# Patient Record
Sex: FEMALE | Race: ASIAN | NOT HISPANIC OR LATINO | URBAN - METROPOLITAN AREA
[De-identification: names, ages, dates, MRNs, and addresses within clinical notes are randomized per-mention and may not be internally consistent; named-entity substitution may affect disease eponyms.]

---

## 2020-07-16 ENCOUNTER — EMERGENCY (EMERGENCY)
Facility: HOSPITAL | Age: 40
LOS: 1 days | Discharge: HOME | End: 2020-07-16
Attending: EMERGENCY MEDICINE
Payer: MEDICAID

## 2020-07-16 VITALS
RESPIRATION RATE: 16 BRPM | WEIGHT: 179.9 LBS | HEIGHT: 59 IN | DIASTOLIC BLOOD PRESSURE: 101 MMHG | TEMPERATURE: 99 F | HEART RATE: 95 BPM | SYSTOLIC BLOOD PRESSURE: 151 MMHG | OXYGEN SATURATION: 100 %

## 2020-07-16 LAB
ALBUMIN SERPL ELPH-MCNC: 3.7 G/DL — SIGNIFICANT CHANGE UP (ref 3.5–5)
ALP SERPL-CCNC: 62 U/L — SIGNIFICANT CHANGE UP (ref 40–120)
ALT FLD-CCNC: 47 U/L DA — SIGNIFICANT CHANGE UP (ref 10–60)
ANION GAP SERPL CALC-SCNC: 5 MMOL/L — SIGNIFICANT CHANGE UP (ref 5–17)
AST SERPL-CCNC: 33 U/L — SIGNIFICANT CHANGE UP (ref 10–40)
BASOPHILS # BLD AUTO: 0.04 K/UL — SIGNIFICANT CHANGE UP (ref 0–0.2)
BASOPHILS NFR BLD AUTO: 0.4 % — SIGNIFICANT CHANGE UP (ref 0–2)
BILIRUB SERPL-MCNC: 0.4 MG/DL — SIGNIFICANT CHANGE UP (ref 0.2–1.2)
BUN SERPL-MCNC: 6 MG/DL — LOW (ref 7–18)
CALCIUM SERPL-MCNC: 8.6 MG/DL — SIGNIFICANT CHANGE UP (ref 8.4–10.5)
CHLORIDE SERPL-SCNC: 103 MMOL/L — SIGNIFICANT CHANGE UP (ref 96–108)
CO2 SERPL-SCNC: 29 MMOL/L — SIGNIFICANT CHANGE UP (ref 22–31)
CREAT SERPL-MCNC: 0.64 MG/DL — SIGNIFICANT CHANGE UP (ref 0.5–1.3)
EOSINOPHIL # BLD AUTO: 0.15 K/UL — SIGNIFICANT CHANGE UP (ref 0–0.5)
EOSINOPHIL NFR BLD AUTO: 1.6 % — SIGNIFICANT CHANGE UP (ref 0–6)
GLUCOSE SERPL-MCNC: 93 MG/DL — SIGNIFICANT CHANGE UP (ref 70–99)
HCT VFR BLD CALC: 37.7 % — SIGNIFICANT CHANGE UP (ref 34.5–45)
HGB BLD-MCNC: 12.1 G/DL — SIGNIFICANT CHANGE UP (ref 11.5–15.5)
IMM GRANULOCYTES NFR BLD AUTO: 0.5 % — SIGNIFICANT CHANGE UP (ref 0–1.5)
LIDOCAIN IGE QN: 229 U/L — SIGNIFICANT CHANGE UP (ref 73–393)
LYMPHOCYTES # BLD AUTO: 1.56 K/UL — SIGNIFICANT CHANGE UP (ref 1–3.3)
LYMPHOCYTES # BLD AUTO: 16.5 % — SIGNIFICANT CHANGE UP (ref 13–44)
MCHC RBC-ENTMCNC: 26.9 PG — LOW (ref 27–34)
MCHC RBC-ENTMCNC: 32.1 GM/DL — SIGNIFICANT CHANGE UP (ref 32–36)
MCV RBC AUTO: 83.8 FL — SIGNIFICANT CHANGE UP (ref 80–100)
MONOCYTES # BLD AUTO: 0.77 K/UL — SIGNIFICANT CHANGE UP (ref 0–0.9)
MONOCYTES NFR BLD AUTO: 8.2 % — SIGNIFICANT CHANGE UP (ref 2–14)
NEUTROPHILS # BLD AUTO: 6.86 K/UL — SIGNIFICANT CHANGE UP (ref 1.8–7.4)
NEUTROPHILS NFR BLD AUTO: 72.8 % — SIGNIFICANT CHANGE UP (ref 43–77)
NRBC # BLD: 0 /100 WBCS — SIGNIFICANT CHANGE UP (ref 0–0)
PLATELET # BLD AUTO: 235 K/UL — SIGNIFICANT CHANGE UP (ref 150–400)
POTASSIUM SERPL-MCNC: 4.3 MMOL/L — SIGNIFICANT CHANGE UP (ref 3.5–5.3)
POTASSIUM SERPL-SCNC: 4.3 MMOL/L — SIGNIFICANT CHANGE UP (ref 3.5–5.3)
PROT SERPL-MCNC: 7.8 G/DL — SIGNIFICANT CHANGE UP (ref 6–8.3)
RBC # BLD: 4.5 M/UL — SIGNIFICANT CHANGE UP (ref 3.8–5.2)
RBC # FLD: 13.2 % — SIGNIFICANT CHANGE UP (ref 10.3–14.5)
SODIUM SERPL-SCNC: 137 MMOL/L — SIGNIFICANT CHANGE UP (ref 135–145)
WBC # BLD: 9.43 K/UL — SIGNIFICANT CHANGE UP (ref 3.8–10.5)
WBC # FLD AUTO: 9.43 K/UL — SIGNIFICANT CHANGE UP (ref 3.8–10.5)

## 2020-07-16 PROCEDURE — 99284 EMERGENCY DEPT VISIT MOD MDM: CPT | Mod: 25

## 2020-07-16 RX ORDER — KETOROLAC TROMETHAMINE 30 MG/ML
30 SYRINGE (ML) INJECTION ONCE
Refills: 0 | Status: DISCONTINUED | OUTPATIENT
Start: 2020-07-16 | End: 2020-07-16

## 2020-07-16 RX ORDER — MORPHINE SULFATE 50 MG/1
4 CAPSULE, EXTENDED RELEASE ORAL ONCE
Refills: 0 | Status: DISCONTINUED | OUTPATIENT
Start: 2020-07-16 | End: 2020-07-16

## 2020-07-16 RX ORDER — ONDANSETRON 8 MG/1
4 TABLET, FILM COATED ORAL ONCE
Refills: 0 | Status: COMPLETED | OUTPATIENT
Start: 2020-07-16 | End: 2020-07-16

## 2020-07-16 RX ADMIN — MORPHINE SULFATE 4 MILLIGRAM(S): 50 CAPSULE, EXTENDED RELEASE ORAL at 22:37

## 2020-07-16 RX ADMIN — Medication 30 MILLIGRAM(S): at 22:37

## 2020-07-16 RX ADMIN — ONDANSETRON 4 MILLIGRAM(S): 8 TABLET, FILM COATED ORAL at 22:36

## 2020-07-16 NOTE — ED PROVIDER NOTE - NS ED ROS FT
Pt denies fevers, chills  nausea, vomiting,   chest pain, palpitations  shortness of breath, orthopnea  melena,   dysuria, hematuria   numbness, weakness, saddle anesthesia  rash  enlarged lymph nodes

## 2020-07-16 NOTE — ED PROVIDER NOTE - PHYSICAL EXAMINATION
Merlene:   Vitals normal   mod-severe distress, writhing around in bed  Awake Alert oriented to person, place, situation,   Normocephalic, atraumatic, neck supple   lungs clear bilaterally  heart s1s rrr,  Abdomen soft, tender RUQ, nondistended  No LE swelling    No rash  Neuro exam grossly intact, no weakness, numbness,

## 2020-07-16 NOTE — ED PROVIDER NOTE - OBJECTIVE STATEMENT
38 yo female with ruq pain.  Pt has history of gallstone 2 years ago and has not had pain since.  She states he has had severe pain x 4 days after eating today.  Pt without prior surgeries, no etoh or NSDAID overuse. Pt denies nausea, vomiting, diarrhea, dysuria, hematuria, change in urinary frequency/urgency, flank pain.

## 2020-07-16 NOTE — ED PROVIDER NOTE - PATIENT PORTAL LINK FT
You can access the FollowMyHealth Patient Portal offered by [facility] by registering at the following website: [email or web address]. By joining [employer]’s FollowMyHealth portal, you will also be able to view your health information using other applications (apps) compatible with our system.

## 2020-07-17 VITALS
SYSTOLIC BLOOD PRESSURE: 140 MMHG | TEMPERATURE: 99 F | DIASTOLIC BLOOD PRESSURE: 78 MMHG | OXYGEN SATURATION: 98 % | RESPIRATION RATE: 16 BRPM | HEART RATE: 82 BPM

## 2020-07-17 LAB
APPEARANCE UR: CLEAR — SIGNIFICANT CHANGE UP
BILIRUB UR-MCNC: NEGATIVE — SIGNIFICANT CHANGE UP
COLOR SPEC: YELLOW — SIGNIFICANT CHANGE UP
DIFF PNL FLD: NEGATIVE — SIGNIFICANT CHANGE UP
GLUCOSE UR QL: NEGATIVE — SIGNIFICANT CHANGE UP
HCG UR QL: NEGATIVE — SIGNIFICANT CHANGE UP
KETONES UR-MCNC: NEGATIVE — SIGNIFICANT CHANGE UP
LEUKOCYTE ESTERASE UR-ACNC: NEGATIVE — SIGNIFICANT CHANGE UP
NITRITE UR-MCNC: NEGATIVE — SIGNIFICANT CHANGE UP
PH UR: 7 — SIGNIFICANT CHANGE UP (ref 5–8)
PROT UR-MCNC: NEGATIVE — SIGNIFICANT CHANGE UP
SP GR SPEC: 1 — LOW (ref 1.01–1.02)
UROBILINOGEN FLD QL: NEGATIVE — SIGNIFICANT CHANGE UP

## 2020-07-17 PROCEDURE — 36415 COLL VENOUS BLD VENIPUNCTURE: CPT

## 2020-07-17 PROCEDURE — 83690 ASSAY OF LIPASE: CPT

## 2020-07-17 PROCEDURE — 96374 THER/PROPH/DIAG INJ IV PUSH: CPT | Mod: XU

## 2020-07-17 PROCEDURE — 80053 COMPREHEN METABOLIC PANEL: CPT

## 2020-07-17 PROCEDURE — 74177 CT ABD & PELVIS W/CONTRAST: CPT

## 2020-07-17 PROCEDURE — 81003 URINALYSIS AUTO W/O SCOPE: CPT

## 2020-07-17 PROCEDURE — 74177 CT ABD & PELVIS W/CONTRAST: CPT | Mod: 26

## 2020-07-17 PROCEDURE — 81025 URINE PREGNANCY TEST: CPT

## 2020-07-17 PROCEDURE — 85027 COMPLETE CBC AUTOMATED: CPT

## 2020-07-17 PROCEDURE — 99284 EMERGENCY DEPT VISIT MOD MDM: CPT | Mod: 25

## 2020-07-17 PROCEDURE — 96375 TX/PRO/DX INJ NEW DRUG ADDON: CPT

## 2020-07-17 NOTE — CONSULT NOTE ADULT - SUBJECTIVE AND OBJECTIVE BOX
Patient is a 39y old  Female who presents with a chief complaint of     HPI  Called see [place] 39y.o. Female w/PMH significant for cholelithiasis, HTN, hyperlipidemia, obesity ?brain thrombus on Plavix for biliary colic. Pt presented to [facility] ER 2020 c/o upper abd pain since 3pm. Pt states she had yellow rice and beef which she cooked herself with oil at 2pm. Pt started experiencing upper abd pain at 3pm. Pt had another small meal at 5pm, and pain intensified at 8pm, prompting her to come to ER. Pt has known gallstone since late  when she was diagnosed at [facility]. Pt was given surgical follow up and had plans for cholecystectomy, but PMD suggested she can delay surgical intervention if gallstone did not bother her. Pt did not have another gallstone attack until today. Pt notes that because of her weight, she has been on keto diet for 27 days. Also notes that she drinks [employer] coffee daily in the morning, which consists of coffee with butter and MCT oil. Currently resting comfortably with relief of upper abd pain. Denies fever, chills, N/V, changes in urinary and stool color and consistency.     PAST SURGICAL HISTORY:  Open appendectomy   x3    MEDICATIONS  (STANDING):  Amlodipine/Valsartan  Plavix    Allergies    No Known Allergies    Vital Signs Last 24 Hrs  T(C): 37.2 (2020 21:40), Max: 37.2 (2020 21:40)  T(F): 99 (2020 21:40), Max: 99 (2020 21:40)  HR: 95 (2020 21:40) (95 - 95)  BP: 151/101 (2020 21:40) (151/101 - 151/101)  BP(mean): --  RR: 16 (2020 21:40) (16 - 16)  SpO2: 100% (2020 21:40) (100% - 100%)    Physical:  Gen: A&Ox3. NAD  Abd: Soft ND, NT. Negative Sanchez's sign    LABS:                        12.1   9.43  )-----------( 235      ( 2020 22:53 )             37.7              07-16    137  |  103  |  6<L>  ----------------------------<  93  4.3   |  29  |  0.64    Ca    8.6      2020 22:53    TPro  7.8  /  Alb  3.7  /  TBili  0.4  /  DBili  x   /  AST  33  /  ALT  47  /  AlkPhos  62  07-16

## 2020-07-17 NOTE — CONSULT NOTE ADULT - ASSESSMENT
39 y.o. F with biliary colic    -Recommend elective cholecystectomy with Dr. Law when cleared by PMD and neurology. Will need Plavix to be on hold for surgery  -No acute surgical intervention indicated at present time  -Recommend cessation of high fat diet. Education regarding avoidance of fatty foods given.  -Outpatient f/u plan explained to patient and daughter in great detail

## 2020-08-11 PROBLEM — Z00.00 ENCOUNTER FOR PREVENTIVE HEALTH EXAMINATION: Status: ACTIVE | Noted: 2020-08-11

## 2020-08-13 ENCOUNTER — APPOINTMENT (OUTPATIENT)
Dept: SURGERY | Facility: CLINIC | Age: 40
End: 2020-08-13
Payer: MEDICAID

## 2020-08-13 VITALS
HEIGHT: 59 IN | HEART RATE: 99 BPM | TEMPERATURE: 97.1 F | DIASTOLIC BLOOD PRESSURE: 105 MMHG | SYSTOLIC BLOOD PRESSURE: 157 MMHG | WEIGHT: 147 LBS | OXYGEN SATURATION: 99 % | BODY MASS INDEX: 29.64 KG/M2

## 2020-08-13 DIAGNOSIS — Z83.3 FAMILY HISTORY OF DIABETES MELLITUS: ICD-10-CM

## 2020-08-13 DIAGNOSIS — Z78.9 OTHER SPECIFIED HEALTH STATUS: ICD-10-CM

## 2020-08-13 DIAGNOSIS — Z82.49 FAMILY HISTORY OF ISCHEMIC HEART DISEASE AND OTHER DISEASES OF THE CIRCULATORY SYSTEM: ICD-10-CM

## 2020-08-13 DIAGNOSIS — Z80.0 FAMILY HISTORY OF MALIGNANT NEOPLASM OF DIGESTIVE ORGANS: ICD-10-CM

## 2020-08-13 DIAGNOSIS — K80.20 CALCULUS OF GALLBLADDER W/OUT CHOLECYSTITIS W/OUT OBSTRUCTION: ICD-10-CM

## 2020-08-13 PROCEDURE — 99203 OFFICE O/P NEW LOW 30 MIN: CPT

## 2020-08-13 NOTE — PLAN
[FreeTextEntry1] : Ms. TRUJILLO  was told significance of findings, options, risks and benefits were explained.  Informed consent for laparoscopic/possible open  cholecystectomy  and potential risks, benefits and alternatives (surgical options were discussed including non-surgical options or the option of no surgery) to the planned surgery were discussed in depth.  All surgical options were discussed including non-surgical treatments.  She wishes to proceed with surgery.  We will plan for surgery on at the next available date, pending any required insurance pre-certification or pre-approval. She agrees to obtain any necessary pre-operative evaluations and testing prior to surgery.\par Patient advised to seek immediate medical attention with any acute change in symptoms or with the development of any new or worsening symptoms.  Patient's questions and concerns addressed to patient's satisfaction, and patient verbalized an understanding of the information discussed.\par \par

## 2020-08-13 NOTE — CONSULT LETTER
[Dear  ___] : Dear  [unfilled], [Consult Letter:] : I had the pleasure of evaluating your patient, [unfilled]. [Please see my note below.] : Please see my note below. [Consult Closing:] : Thank you very much for allowing me to participate in the care of this patient.  If you have any questions, please do not hesitate to contact me. [Sincerely,] : Sincerely, [FreeTextEntry3] : Mark Cha MD, FACS

## 2020-08-13 NOTE — PHYSICAL EXAM
[Alert] : alert [Oriented to Person] : oriented to person [Oriented to Place] : oriented to place [Oriented to Time] : oriented to time [Calm] : calm [Abdominal Masses] : No abdominal masses [de-identified] : She  is alert, well-groomed, and cheerful.\par   [Abdomen Tenderness] : ~T ~M No abdominal tenderness [de-identified] :   anicteric.  Nasal mucosa pink, septum midline. Oral mucosa pink.  Tongue midline, Pharynx without exudates.\par   [de-identified] :  Neck supple. Trachea midline. Thyroid isthmus barely palpable, lobes not felt.\par   [de-identified] :  and appendectomy scars

## 2020-08-13 NOTE — HISTORY OF PRESENT ILLNESS
[de-identified] : Ms. ENOC TRUJILLO is a 39 year  old patient who was referred by Dr. Ezekiel Alvarado with the chief complaint of having right upper quadrant and epigastric pain for 2 years.  Pain  is radiating to the back. She reports no nausea or vomiting and no history of jaundice, acholia or choluria.   Appetite is good and weight is stable.   She   has no family history of biliary tract disease.  She had an abdominal CT scan of abdomen and pelvis on  07/17/2020 which revealed Mildly distended gallbladder with gallstones. CBD is normal\par \par

## 2020-08-13 NOTE — DATA REVIEWED
[FreeTextEntry1] : \par EXAM: CT ABDOMEN AND PELVIS IC \par PROCEDURE DATE: 07/17/2020 \par INTERPRETATION: CLINICAL HISTORY: Right upper quadrant and epigastric pain. \par \par TECHNIQUE: CT scan of the abdomen and pelvis with IV contrast. \par Transaxial images were acquired from the domes of the diaphragm to the \par symphysis pubis with intravenous contrast. Oral contrast was withheld. \par Coronal and sagittal images were also provided from the transaxial source \par data. 90mLs of Omnipaque 350 was administered intravenously without \par complication and and mLs was discarded. \par \par COMPARISON: There is no prior study for comparison. \par \par FINDINGS: \par The heart is not enlarged. The lung bases are clear. \par \par The large and small bowel are normal in caliber without obstruction. There \par is no free intraperitoneal air or abdominal abscess. The appendix is cannot \par discretely visualize, however, there are no secondary signs of acute \par appendicitis. There is no abnormal bowel wall thickening or inflammatory \par change. \par \par The gallbladder is mildly distended containing tiny gallstones. No \par significant gallbladder wall thickening, pericholecystic fluid or \par inflammatory change. The biliary tree is not dilated. The liver, spleen, \par pancreas and adrenal glands are unremarkable aside from hepatomegaly. The \par kidneys enhance symmetrically without hydronephrosis. \par \par The abdominal aorta is normal in caliber. There is no retroperitoneal, \par pelvic or inguinal adenopathy. There is no ascites. \par \par The urinary bladder is unremarkable. Mildly enlarged fibroid uterus. No \par adnexal soft tissue masses. \par \par The visualized osseous structures demonstrate no acute abnormality. \par \par IMPRESSION: \par Mildly distended gallbladder with gallstones. No secondary signs of acute \par cholecystitis. \par \par ROBERT STEEN M.D., ATTENDING RADIOLOGIST \par This document has been electronically signed. Jul 17 2020 3:15AM

## 2020-09-03 ENCOUNTER — OUTPATIENT (OUTPATIENT)
Dept: OUTPATIENT SERVICES | Facility: HOSPITAL | Age: 40
LOS: 1 days | End: 2020-09-03
Payer: MEDICAID

## 2020-09-03 VITALS
WEIGHT: 175.05 LBS | DIASTOLIC BLOOD PRESSURE: 73 MMHG | HEIGHT: 60 IN | TEMPERATURE: 98 F | SYSTOLIC BLOOD PRESSURE: 143 MMHG | OXYGEN SATURATION: 98 % | HEART RATE: 87 BPM | RESPIRATION RATE: 16 BRPM

## 2020-09-03 DIAGNOSIS — Z01.818 ENCOUNTER FOR OTHER PREPROCEDURAL EXAMINATION: ICD-10-CM

## 2020-09-03 DIAGNOSIS — Z98.891 HISTORY OF UTERINE SCAR FROM PREVIOUS SURGERY: Chronic | ICD-10-CM

## 2020-09-03 DIAGNOSIS — K81.9 CHOLECYSTITIS, UNSPECIFIED: ICD-10-CM

## 2020-09-03 DIAGNOSIS — Z90.49 ACQUIRED ABSENCE OF OTHER SPECIFIED PARTS OF DIGESTIVE TRACT: Chronic | ICD-10-CM

## 2020-09-03 LAB — BLD GP AB SCN SERPL QL: SIGNIFICANT CHANGE UP

## 2020-09-03 PROCEDURE — G0463: CPT

## 2020-09-03 NOTE — H&P PST ADULT - HISTORY OF PRESENT ILLNESS
39 years old female presented to [facility] foe evaluation before surgery, patient was diagnosed with cholecystitis ,unspecified and is scheduled for laparoscopic cholecystectomy with intraoperative cholangiogram, possible open on 09/16/2020.

## 2020-09-03 NOTE — H&P PST ADULT - CONSTITUTIONAL
How Severe Are Your Spot(S)?: mild What Is The Reason For Today's Visit?: Full Body Skin Examination What Is The Reason For Today's Visit? (Being Monitored For X): concerning skin lesions on an annual basis detailed exam

## 2020-09-03 NOTE — H&P PST ADULT - NEGATIVE OPHTHALMOLOGIC SYMPTOMS
no discharge R/no pain L/no pain R/no irritation R/no lacrimation R/no blurred vision L/no blurred vision R/no irritation L/no diplopia/no photophobia/no discharge L/no lacrimation L

## 2020-09-03 NOTE — H&P PST ADULT - NEGATIVE NEUROLOGICAL SYMPTOMS
no headache/no confusion/no syncope/no loss of sensation/no vertigo/no paresthesias/no weakness/no generalized seizures/no hemiparesis/no transient paralysis/no focal seizures/no loss of consciousness/no tremors

## 2020-09-03 NOTE — H&P PST ADULT - NSICDXPASTMEDICALHX_GEN_ALL_CORE_FT
PAST MEDICAL HISTORY:  H/O headache pt under care of Neurology , no seizure activity    Plantar fasciitis, left steroid shot 1 week ago- 08/25/2020 PAST MEDICAL HISTORY:  Cholecystitis, unspecified     H/O headache pt under care of Neurology , no seizure activity- pineal cyst on brain MRI    Hypertension     Neck pain limited rotation to right    Plantar fasciitis, left steroid shot 1 week ago- 08/25/2020

## 2020-09-03 NOTE — H&P PST ADULT - MUSCULOSKELETAL
details… detailed exam no joint warmth/no calf tenderness/no joint swelling/no joint erythema/decreased ROM due to pain

## 2020-09-03 NOTE — H&P PST ADULT - NEGATIVE GENERAL GENITOURINARY SYMPTOMS
normal urinary frequency/no dysuria/no urinary hesitancy/no incontinence/no nocturia/no hematuria/no renal colic

## 2020-09-03 NOTE — H&P PST ADULT - NSICDXPASTSURGICALHX_GEN_ALL_CORE_FT
PAST SURGICAL HISTORY:  S/P appendectomy     S/P  section , ,  ( last c- section with blood patch to epidural site)

## 2020-09-09 PROBLEM — Z87.898 PERSONAL HISTORY OF OTHER SPECIFIED CONDITIONS: Chronic | Status: ACTIVE | Noted: 2020-09-03

## 2020-09-09 PROBLEM — M54.2 CERVICALGIA: Chronic | Status: ACTIVE | Noted: 2020-09-03

## 2020-09-09 PROBLEM — K81.9 CHOLECYSTITIS, UNSPECIFIED: Chronic | Status: ACTIVE | Noted: 2020-09-03

## 2020-09-09 PROBLEM — I10 ESSENTIAL (PRIMARY) HYPERTENSION: Chronic | Status: ACTIVE | Noted: 2020-09-03

## 2020-09-09 PROBLEM — M72.2 PLANTAR FASCIAL FIBROMATOSIS: Chronic | Status: ACTIVE | Noted: 2020-09-03

## 2020-09-13 ENCOUNTER — APPOINTMENT (OUTPATIENT)
Dept: DISASTER EMERGENCY | Facility: CLINIC | Age: 40
End: 2020-09-13

## 2020-09-13 DIAGNOSIS — Z01.818 ENCOUNTER FOR OTHER PREPROCEDURAL EXAMINATION: ICD-10-CM

## 2020-09-14 LAB — SARS-COV-2 N GENE NPH QL NAA+PROBE: NOT DETECTED

## 2020-09-15 ENCOUNTER — TRANSCRIPTION ENCOUNTER (OUTPATIENT)
Age: 40
End: 2020-09-15

## 2020-09-16 ENCOUNTER — OUTPATIENT (OUTPATIENT)
Dept: OUTPATIENT SERVICES | Facility: HOSPITAL | Age: 40
LOS: 1 days | End: 2020-09-16
Payer: MEDICAID

## 2020-09-16 ENCOUNTER — APPOINTMENT (OUTPATIENT)
Dept: SURGERY | Facility: HOSPITAL | Age: 40
End: 2020-09-16

## 2020-09-16 ENCOUNTER — RESULT REVIEW (OUTPATIENT)
Age: 40
End: 2020-09-16

## 2020-09-16 VITALS
HEART RATE: 95 BPM | DIASTOLIC BLOOD PRESSURE: 74 MMHG | TEMPERATURE: 98 F | RESPIRATION RATE: 18 BRPM | SYSTOLIC BLOOD PRESSURE: 126 MMHG | OXYGEN SATURATION: 98 %

## 2020-09-16 VITALS
RESPIRATION RATE: 115 BRPM | OXYGEN SATURATION: 100 % | TEMPERATURE: 99 F | HEIGHT: 59 IN | SYSTOLIC BLOOD PRESSURE: 151 MMHG | WEIGHT: 164.91 LBS | DIASTOLIC BLOOD PRESSURE: 101 MMHG

## 2020-09-16 DIAGNOSIS — I10 ESSENTIAL (PRIMARY) HYPERTENSION: ICD-10-CM

## 2020-09-16 DIAGNOSIS — Z90.49 ACQUIRED ABSENCE OF OTHER SPECIFIED PARTS OF DIGESTIVE TRACT: Chronic | ICD-10-CM

## 2020-09-16 DIAGNOSIS — Z98.891 HISTORY OF UTERINE SCAR FROM PREVIOUS SURGERY: Chronic | ICD-10-CM

## 2020-09-16 DIAGNOSIS — K81.9 CHOLECYSTITIS, UNSPECIFIED: ICD-10-CM

## 2020-09-16 LAB
BLD GP AB SCN SERPL QL: SIGNIFICANT CHANGE UP
HCG UR QL: NEGATIVE — SIGNIFICANT CHANGE UP

## 2020-09-16 PROCEDURE — 88304 TISSUE EXAM BY PATHOLOGIST: CPT

## 2020-09-16 PROCEDURE — C1889: CPT

## 2020-09-16 PROCEDURE — 47563 LAPARO CHOLECYSTECTOMY/GRAPH: CPT

## 2020-09-16 PROCEDURE — 88304 TISSUE EXAM BY PATHOLOGIST: CPT | Mod: 26

## 2020-09-16 PROCEDURE — 81025 URINE PREGNANCY TEST: CPT

## 2020-09-16 PROCEDURE — 76000 FLUOROSCOPY <1 HR PHYS/QHP: CPT

## 2020-09-16 PROCEDURE — 47563 LAPARO CHOLECYSTECTOMY/GRAPH: CPT | Mod: AS

## 2020-09-16 PROCEDURE — 86901 BLOOD TYPING SEROLOGIC RH(D): CPT

## 2020-09-16 PROCEDURE — 36415 COLL VENOUS BLD VENIPUNCTURE: CPT

## 2020-09-16 PROCEDURE — 86900 BLOOD TYPING SEROLOGIC ABO: CPT

## 2020-09-16 PROCEDURE — 86850 RBC ANTIBODY SCREEN: CPT

## 2020-09-16 RX ORDER — GABAPENTIN 400 MG/1
1 CAPSULE ORAL
Qty: 15 | Refills: 0
Start: 2020-09-16 | End: 2020-09-20

## 2020-09-16 RX ORDER — GABAPENTIN 400 MG/1
100 CAPSULE ORAL THREE TIMES A DAY
Refills: 0 | Status: DISCONTINUED | OUTPATIENT
Start: 2020-09-16 | End: 2020-09-23

## 2020-09-16 RX ORDER — ACETAMINOPHEN 500 MG
1000 TABLET ORAL ONCE
Refills: 0 | Status: COMPLETED | OUTPATIENT
Start: 2020-09-16 | End: 2020-09-16

## 2020-09-16 RX ORDER — HYDROMORPHONE HYDROCHLORIDE 2 MG/ML
0.5 INJECTION INTRAMUSCULAR; INTRAVENOUS; SUBCUTANEOUS
Refills: 0 | Status: DISCONTINUED | OUTPATIENT
Start: 2020-09-16 | End: 2020-09-16

## 2020-09-16 RX ORDER — KETOROLAC TROMETHAMINE 30 MG/ML
30 SYRINGE (ML) INJECTION ONCE
Refills: 0 | Status: DISCONTINUED | OUTPATIENT
Start: 2020-09-16 | End: 2020-09-16

## 2020-09-16 RX ORDER — HYDROMORPHONE HYDROCHLORIDE 2 MG/ML
1 INJECTION INTRAMUSCULAR; INTRAVENOUS; SUBCUTANEOUS
Refills: 0 | Status: DISCONTINUED | OUTPATIENT
Start: 2020-09-16 | End: 2020-09-16

## 2020-09-16 RX ORDER — CYCLOBENZAPRINE HYDROCHLORIDE 10 MG/1
1 TABLET, FILM COATED ORAL
Qty: 0 | Refills: 0 | DISCHARGE

## 2020-09-16 RX ORDER — ONDANSETRON 8 MG/1
4 TABLET, FILM COATED ORAL ONCE
Refills: 0 | Status: DISCONTINUED | OUTPATIENT
Start: 2020-09-16 | End: 2020-09-16

## 2020-09-16 RX ORDER — LOSARTAN POTASSIUM 100 MG/1
1 TABLET, FILM COATED ORAL
Qty: 0 | Refills: 0 | DISCHARGE

## 2020-09-16 RX ORDER — SODIUM CHLORIDE 9 MG/ML
3 INJECTION INTRAMUSCULAR; INTRAVENOUS; SUBCUTANEOUS EVERY 8 HOURS
Refills: 0 | Status: DISCONTINUED | OUTPATIENT
Start: 2020-09-16 | End: 2020-09-16

## 2020-09-16 RX ADMIN — Medication 30 MILLIGRAM(S): at 12:27

## 2020-09-16 RX ADMIN — Medication 1000 MILLIGRAM(S): at 12:45

## 2020-09-16 RX ADMIN — Medication 400 MILLIGRAM(S): at 12:28

## 2020-09-16 RX ADMIN — GABAPENTIN 100 MILLIGRAM(S): 400 CAPSULE ORAL at 16:15

## 2020-09-16 RX ADMIN — Medication 30 MILLIGRAM(S): at 12:45

## 2020-09-16 NOTE — ASU PATIENT PROFILE, ADULT - PMH
Cholecystitis, unspecified    H/O headache  pt under care of Neurology , no seizure activity- pineal cyst on brain MRI  Hypertension    Neck pain  limited rotation to right  Plantar fasciitis, left  steroid shot 1 week ago- 08/25/2020

## 2020-09-16 NOTE — ASU DISCHARGE PLAN (ADULT/PEDIATRIC) - FREQUENT HAND WASHING PREVENTS THE SPREAD OF INFECTION.
Patient calling she is being treated for dermatitis, she is currently taking 10mgs predisone and just finished. Still has itching and would like to know if she can have a refill but would like to increase to 20mgs. Please call.    She states that Dr Fredy Chen Statement Selected

## 2020-09-16 NOTE — ASU DISCHARGE PLAN (ADULT/PEDIATRIC) - PAIN MANAGEMENT
Prescriptions electronically submitted to pharmacy from [place]/Take over the counter pain medication

## 2020-09-16 NOTE — ASU DISCHARGE PLAN (ADULT/PEDIATRIC) - CARE PROVIDER_API CALL
Mark Cha  SURGERY  [address], [place] [address]  Phone: [phone]  Fax: [phone]  Follow Up Time:

## 2020-09-16 NOTE — ASU DISCHARGE PLAN (ADULT/PEDIATRIC) - ASU DC SPECIAL INSTRUCTIONSFT
Please follow-up with your surgeon in 1 week. Drink plenty of fluids and rest as needed. Do not lift anything heavier than 10 pounds for 2 weeks. You may take Motrin or Tylenol as needed for mild pain; you may take your prescribed gabapentin for breakthrough pain. Call for any fever over 101, nausea, vomiting, severe pain, no passing of gas or bowel movement. You may shower 48 hours postoperatively. Remove outer dressing. Keep white steri-strips in place and allow them to fall off on their own. Pat dry.

## 2020-09-21 LAB — SURGICAL PATHOLOGY STUDY: SIGNIFICANT CHANGE UP

## 2020-09-24 PROBLEM — K81.9 CHOLECYSTITIS: Status: ACTIVE | Noted: 2020-08-13

## 2020-10-01 ENCOUNTER — APPOINTMENT (OUTPATIENT)
Dept: SURGERY | Facility: CLINIC | Age: 40
End: 2020-10-01
Payer: MEDICAID

## 2020-10-01 DIAGNOSIS — K81.9 CHOLECYSTITIS, UNSPECIFIED: ICD-10-CM

## 2020-10-01 PROCEDURE — 99024 POSTOP FOLLOW-UP VISIT: CPT

## 2020-10-01 NOTE — ASSESSMENT
[FreeTextEntry1] : Ms. TRUJILLO is doing well, with excellent post-operative recovery. All surgical incisions are healing well and as expected. There is no evidence of infection or complication, and she is progressing as expected. Post-operative wound care, activity, restrictions and precautions reinforced. Patient instructed to refrain from any heavy lifting greater than 10-15 pounds for at least 4 weeks post-operatively. pathology results were discussed in details.  Patient's questions and concerns addressed to patient's satisfaction.

## 2020-10-01 NOTE — PLAN
[FreeTextEntry1] : Ms. TRUJILLO will follow up  if needed. Warning signs, follow up, and restrictions were discussed with the patient.

## 2020-10-01 NOTE — DATA REVIEWED
[FreeTextEntry1] : ENOC TRUJILLO                      1\par \par \par \par Surgical Final Report\par \par \par \par \par Final Diagnosis\par Gallbladder, cholecystectomy: Chronic calculus cholecystitis\par \par Verified by: Payal Cardona M.D.\par (Electronic Signature)\par Reported on: 09/21/20 14:22 EDT, [facility],\par [address], [place] [address]\par Phone: [phone]   Fax: [phone]\par _________________________________________________________________\par \par Clinical History\par Cholecystitis. Laparoscopic cholecystectomy\par

## 2020-10-01 NOTE — HISTORY OF PRESENT ILLNESS
[de-identified] : Ms. TRUJILLO  is s/p  Laparoscopic cholecystectomy on 09/16/2020. Today Ms. TRUJILLO offers no complaints. patient reports no fever, chills,  or  pain. Her  surgical wounds are  healing well. No signs of inflammation, infection or exudate. Patient reports good bowel movements and appetite.

## 2020-10-01 NOTE — PHYSICAL EXAM
[Calm] : calm [de-identified] : She  is alert, well-groomed, and cheerful.\par   [de-identified] : Surgical wounds are  healing well.   no signs of  inflammation or infection.

## 2021-12-18 ENCOUNTER — EMERGENCY (EMERGENCY)
Facility: HOSPITAL | Age: 41
LOS: 1 days | Discharge: HOME | End: 2021-12-18
Attending: EMERGENCY MEDICINE
Payer: MEDICAID

## 2021-12-18 VITALS
HEIGHT: 59 IN | WEIGHT: 174.17 LBS | HEART RATE: 89 BPM | RESPIRATION RATE: 16 BRPM | SYSTOLIC BLOOD PRESSURE: 144 MMHG | DIASTOLIC BLOOD PRESSURE: 100 MMHG | TEMPERATURE: 98 F | OXYGEN SATURATION: 98 %

## 2021-12-18 DIAGNOSIS — Z98.891 HISTORY OF UTERINE SCAR FROM PREVIOUS SURGERY: Chronic | ICD-10-CM

## 2021-12-18 DIAGNOSIS — Z90.49 ACQUIRED ABSENCE OF OTHER SPECIFIED PARTS OF DIGESTIVE TRACT: Chronic | ICD-10-CM

## 2021-12-18 LAB — SARS-COV-2 RNA SPEC QL NAA+PROBE: DETECTED

## 2021-12-18 PROCEDURE — 99283 EMERGENCY DEPT VISIT LOW MDM: CPT

## 2021-12-18 PROCEDURE — 87635 SARS-COV-2 COVID-19 AMP PRB: CPT

## 2021-12-18 PROCEDURE — 99284 EMERGENCY DEPT VISIT MOD MDM: CPT

## 2021-12-18 NOTE — ED PROVIDER NOTE - CLINICAL SUMMARY MEDICAL DECISION MAKING FREE TEXT BOX
40 yod presents to the ED with complaints of COVID-19 - like symptoms. Will perform COVID-19 test and discharge home with supportive care.

## 2021-12-18 NOTE — ED PROVIDER NOTE - PHYSICAL EXAMINATION
Gen: Well-developed, well-nourished, NAD, VS as noted by nursing. HEENT: NCAT, mmm   Chest: RRR, nl S1 and S2, no m/r/g. Resp: CTAB, no w/r/r  Abd: nl BS, soft, nt/nd. Ext: Warm, dry

## 2021-12-18 NOTE — ED PROVIDER NOTE - PATIENT PORTAL LINK FT
You can access the FollowMyHealth Patient Portal offered by [facility] by registering at the following website: [email or web address]. By joining [employer]’s FollowMyHealth portal, you will also be able to view your health information using other applications (apps) compatible with our system.

## 2021-12-18 NOTE — ED PROVIDER NOTE - NSFOLLOWUPINSTRUCTIONS_ED_ALL_ED_FT
Follow up with your primary care doctor within 2 days    Treat your symptoms with over the counter mediations such as dayquil, niquil and/or flonase    Take motrin/tylenol as needed for fever or pain.     If you experience any new or worsening symptoms or if you are concerned you can always come back to the emergency for a re-evaluation.

## 2021-12-18 NOTE — ED PROVIDER NOTE - NSICDXPASTMEDICALHX_GEN_ALL_CORE_FT
PAST MEDICAL HISTORY:  Cholecystitis, unspecified     H/O headache pt under care of Neurology , no seizure activity- pineal cyst on brain MRI    Hypertension     Neck pain limited rotation to right    Plantar fasciitis, left steroid shot 1 week ago- 08/25/2020

## 2021-12-18 NOTE — ED ADULT TRIAGE NOTE - BMI (KG/M2)
Controlled Substance Refill Request for Norco  Problem List Complete:    No     PROVIDER TO CONSIDER COMPLETION OF PROBLEM LIST AND OVERVIEW/CONTROLLED SUBSTANCE AGREEMENT    Last Written Prescription Date:  5/8/2019  Last Fill Quantity: 60,   # refills: 0    THE MOST RECENT OFFICE VISIT MUST BE WITHIN THE PAST 3 MONTHS. AT LEAST ONE FACE TO FACE VISIT MUST OCCUR EVERY 6 MONTHS. ADDITIONAL VISITS CAN BE VIRTUAL.  (THIS STATEMENT SHOULD BE DELETED.)    Last Office Visit with [facility] primary care provider: 2/13/2019    Future Office visit:   Next 5 appointments (look out 90 days)    Jun 05, 2019 10:00 AM CDT  Kolton Gonzalez with Yuanjuanita Schofield MD  [facility] [address] [place] [id number] [phone]          Controlled substance agreement:   Encounter-Level CSA - 08/20/2018:    Controlled Substance Agreement - Scan on 8/24/2018 10:37 AM: CONTROLLED SUBSTANCE AGREEMENT (below)       Patient-Level CSA:    There are no patient-level csa.         Last Urine Drug Screen:   Pain Drug SCR UR W RPTD Meds   Date Value Ref Range Status   07/09/2018 FINAL  Final     Comment:     (Note)  ====================================================================  [facility]RE COMP DRUG ANALYSIS,UR  ====================================================================  Test                             Result       Flag       Units        Drug Present and Declared for Prescription Verification   Lorazepam                      557          EXPECTED   ng/mg creat    Source of lorazepam is a scheduled prescription medication.   Hydrocodone                    2186         EXPECTED   ng/mg creat   Hydromorphone                  244          EXPECTED   ng/mg creat   Dihydrocodeine                 408          EXPECTED   ng/mg creat   Norhydrocodone                 5264         EXPECTED   ng/mg creat    Sources of hydrocodone include scheduled prescription    medications. Hydromorphone,  dihydrocodeine and norhydrocodone are    expected metabolites of hydrocodone. Hydromorphone and    dihydrocodeine are also available as scheduled prescription    medications.   Citalopram                     PRESENT      EXPECTED                 Desmethylcitalopram            PRESENT      EXPECTED                  Desmethylcitalopram is an expected metabolite of citalopram or    the enantiomeric form, escitalopram.   Acetaminophen                  PRESENT      EXPECTED                Drug Present not Declared for Prescription Verification   Carboxy-THC                    30           UNEXPECTED ng/mg creat    Carboxy-THC is a metabolite of tetrahydrocannabinol  (THC).    Source of THC is most commonly illicit, but THC is also present    in a scheduled prescription medication.  Drug Absent but Declared for Prescription Verification   Oxycodone                      Not Detected UNEXPECTED ng/mg creat  ====================================================================  Test                      Result    Flag   Units      Ref Range        Creatinine              86               mg/dL      >=20            ====================================================================  Declared Medications:  The flagging and interpretation on this report are based on the  following declared medications.  Unexpected results may arise from  inaccuracies in the declared medications.  **Note: The testing scope of this panel includes these medications:  Citalopram (Lexapro)  Hydrocodone (Norco)  Lorazepam  Oxycodone  **Note: The testing scope of this panel does not include small to  moderate amounts of these reported medications:  Acetaminophen (Norco)  ====================================================================  For clinical consultation, please call [phone].  ====================================================================  Analysis performed by [employer] [place] [address]     , No results  found for: COMDAT, No results found for: THC13, PCP13, COC13, MAMP13, OPI13, AMP13, BZO13, TCA13, MTD13, BAR13, OXY13, PPX13, BUP13     [email or web address]       checked in past 3 months? Yes 5/13/2019           35.2

## 2021-12-18 NOTE — ED PROVIDER NOTE - CARE PLAN
Principal Discharge DX:	Viral upper respiratory illness  Secondary Diagnosis:	Encounter for screening for COVID-19   1

## 2021-12-18 NOTE — ED ADULT TRIAGE NOTE - HEIGHT IN FEET
PATIENT REPOSITIONED IN BED. PATIENT APPEARS TO BE RESTING PEACFULLY. BED
ALARM IS ON FOR SAFETY. CALL LIGHT IN REACH. 4

## 2021-12-18 NOTE — ED ADULT TRIAGE NOTE - CHIEF COMPLAINT QUOTE
C/o covid like symptoms - congestion, bodyaches, headache, diarrhea x 3 days. Daughter covid positive

## 2021-12-18 NOTE — ED PROVIDER NOTE - OBJECTIVE STATEMENT
40 year old female with no pertinent PMHx presents to the ED with complaints of congestion, body aches, headaches, loss of taste and smell, diarrhea, and fevers for three days. Patient reports having a measured T-max of 101.0 F at home. Patient endorses that her six-year-old daughter is currently positive for COVID-19. Patient notes that she consulted her PMD regarding her symptoms, and was started on Azithromycin, cough medications, and Motrin/Tylenol. Patient denies any chest pain, shortness of breath, and all other acute complaints. NKDA. 40 year old female with no pertinent PMHx presents to the ED with complaints of congestion, body aches, headaches, loss of taste and smell, diarrhea, and fevers for three days. Patient reports having a measured T-max of 101.0 F at home. Patient endorses that her six-year-old daughter is currently positive for COVID-19. Patient notes that she consulted her PMD regarding her symptoms, and was started on Azithromycin, cough medications, and Motrin/Tylenol. Patient denies any chest pain, shortness of breath, and all other acute complaints. NKDA.    GABRIELLE: covid exposure, fever

## 2022-01-11 NOTE — REASON FOR VISIT
A catheter (Catheter 6fr 145d Pgtl Crv 110cm 6 Sh Radopq Braid)  was used to cross the aortic valve and perform left ventriculography by hand injection. .  [Post Op: _________] : a [unfilled] post op visit

## 2022-08-19 ENCOUNTER — EMERGENCY (EMERGENCY)
Facility: HOSPITAL | Age: 42
LOS: 1 days | Discharge: HOME | End: 2022-08-19
Attending: EMERGENCY MEDICINE
Payer: MEDICAID

## 2022-08-19 VITALS
RESPIRATION RATE: 18 BRPM | TEMPERATURE: 98 F | HEART RATE: 80 BPM | HEIGHT: 59 IN | DIASTOLIC BLOOD PRESSURE: 87 MMHG | SYSTOLIC BLOOD PRESSURE: 154 MMHG | OXYGEN SATURATION: 97 %

## 2022-08-19 DIAGNOSIS — Z98.891 HISTORY OF UTERINE SCAR FROM PREVIOUS SURGERY: Chronic | ICD-10-CM

## 2022-08-19 DIAGNOSIS — Z90.49 ACQUIRED ABSENCE OF OTHER SPECIFIED PARTS OF DIGESTIVE TRACT: Chronic | ICD-10-CM

## 2022-08-19 PROCEDURE — 99283 EMERGENCY DEPT VISIT LOW MDM: CPT

## 2022-08-19 PROCEDURE — 99284 EMERGENCY DEPT VISIT MOD MDM: CPT

## 2022-08-19 RX ORDER — IBUPROFEN 200 MG
600 TABLET ORAL ONCE
Refills: 0 | Status: COMPLETED | OUTPATIENT
Start: 2022-08-19 | End: 2022-08-19

## 2022-08-19 RX ADMIN — Medication 600 MILLIGRAM(S): at 22:11

## 2022-08-19 RX ADMIN — Medication 600 MILLIGRAM(S): at 22:25

## 2022-08-19 NOTE — ED PROVIDER NOTE - PATIENT PORTAL LINK FT
You can access the FollowMyHealth Patient Portal offered by [facility] by registering at the following website: [email or web address]. By joining [employer]’s FollowMyHealth portal, you will also be able to view your health information using other applications (apps) compatible with our system.

## 2022-08-19 NOTE — ED PROVIDER NOTE - ENMT, MLM
Airway patent, Nasal mucosa clear. Mouth with normal mucosa. Throat has no vesicles, no oropharyngeal exudates and uvula is midline. no malocclusion

## 2022-08-19 NOTE — ED PROVIDER NOTE - EYES, MLM
Clear bilaterally, pupils equal, round and reactive to light. EOMi intact upper eyelid small hematoma. no ptosis.

## 2022-08-19 NOTE — ED PROVIDER NOTE - OBJECTIVE STATEMENT
41 yr old female with no hx presents to ed c/o left facial and eye pain around 6p while at work in [employer]. 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 the glass cabinet door that houses the jewelry fell onto pt left upper eyelid and left side of face. no loc, + blurred vision. no double vision, no ac use.

## 2022-08-19 NOTE — ED ADULT NURSE NOTE - OBJECTIVE STATEMENT
States a glass cabinet door housing jewelry while at work fell on her upper eyelid and left face today , c/o pain to left upper eyelid , blurring of vision , States a glass cabinet door housing jewelry while at work fell on her upper eyelid and left face today , c/o pain to left upper eyelid , blurring of vision ,.No open wounds or bleeding noted . States a glass cabinet door housing jewelry while at work fell on her upper eyelid and left face today , c/o pain to left upper eyelid , blurring of vision ,.No open wounds or bleeding noted .Ice pack to left side of face.

## 2022-08-19 NOTE — H&P PST ADULT - VENOUS THROMBOEMBOLISM HISTORY
Shift [id number]   ?  A/I : Monitored vitals and assessed pt status. A0x4. Vitals stable on RA, MAP 76-93. Normal sinus rhythm/sinus tachycardia 90-110s. HM3 numbers WDL, no alarms noted. LVAD speed adjusted to 5200 d/t Echo results. Afebrile. Urinating adequately via urinal. BMx1 this afternoon, loose. Fair appetite on 3 gm Na, with 2 L FR. Denies chest pain, palpitations, shortness of breath, nausea. Notified Cards 2 patient intermittently dizzy even when laying in bed, BP and MAP have been WDL. No new skin concerns noted, preventative mepilex to coccyx, sternal incision WDL. Up with assist 1 with gait belt and walker. IV access: R PICC heparin locked.     Changed: LVAD speed from 5300 to 5200  Running:   PRN:   ?  P: Continue to monitor Pt status and report changes to Cards 2. Likely discharge to  [facility] tomorrow, ride tentative for 1100.    (0) indicator not present

## 2022-08-19 NOTE — ED PROVIDER NOTE - CLINICAL SUMMARY MEDICAL DECISION MAKING FREE TEXT BOX
41 yr old female with no hx presents to ed c/o left facial and eye pain around 6p while at work in [employer]. 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 the glass cabinet door that houses the jewelry fell onto pt left upper eyelid and left side of face. no loc, + blurred vision. no double vision, no ac use.    facial contusion with upper eyelid contusion- ice area, motrin for pain. rest.

## 2022-10-04 ENCOUNTER — APPOINTMENT (OUTPATIENT)
Dept: OBGYN | Facility: CLINIC | Age: 42
End: 2022-10-04

## 2023-11-30 ENCOUNTER — NON-APPOINTMENT (OUTPATIENT)
Age: 43
End: 2023-11-30

## 2024-02-21 NOTE — H&P PST ADULT - CARDIOVASCULAR DETAILS

## 2024-04-25 ENCOUNTER — NON-APPOINTMENT (OUTPATIENT)
Age: 44
End: 2024-04-25